# Patient Record
Sex: MALE | Employment: FULL TIME | URBAN - METROPOLITAN AREA
[De-identification: names, ages, dates, MRNs, and addresses within clinical notes are randomized per-mention and may not be internally consistent; named-entity substitution may affect disease eponyms.]

---

## 2019-03-26 ENCOUNTER — TELEPHONE (OUTPATIENT)
Dept: NEUROSURGERY | Facility: CLINIC | Age: 54
End: 2019-03-26

## 2019-03-26 NOTE — TELEPHONE ENCOUNTER
----- Message from Mari Ramos sent at 3/21/2019 11:23 AM CDT -----  Regarding: Neurosurgery Referral/Records  Good Afternoon,    Please see the media tab for the referral and records (recent MRI report included)  from Dr. Simon's office. The patient is being referred for degenerative disc disease. Please let me know if you have any questions .    Thank you!    Mari Ramos   [facility]  [phone]

## 2019-04-01 ENCOUNTER — INITIAL CONSULT (OUTPATIENT)
Dept: NEUROSURGERY | Facility: CLINIC | Age: 54
End: 2019-04-01
Payer: COMMERCIAL

## 2019-04-01 ENCOUNTER — HOSPITAL ENCOUNTER (OUTPATIENT)
Dept: RADIOLOGY | Facility: HOSPITAL | Age: 54
Discharge: HOME | End: 2019-04-01
Attending: NEUROLOGICAL SURGERY
Payer: COMMERCIAL

## 2019-04-01 VITALS
BODY MASS INDEX: 23.65 KG/M2 | HEIGHT: 74 IN | SYSTOLIC BLOOD PRESSURE: 115 MMHG | RESPIRATION RATE: 18 BRPM | DIASTOLIC BLOOD PRESSURE: 79 MMHG | HEART RATE: 85 BPM | WEIGHT: 184.31 LBS

## 2019-04-01 DIAGNOSIS — M54.12 CERVICAL RADICULOPATHY AT C7: ICD-10-CM

## 2019-04-01 DIAGNOSIS — M54.2 NECK PAIN: ICD-10-CM

## 2019-04-01 DIAGNOSIS — M54.2 NECK PAIN: Primary | ICD-10-CM

## 2019-04-01 DIAGNOSIS — M50.20 CERVICAL DISC HERNIATION: ICD-10-CM

## 2019-04-01 PROCEDURE — 72040 XR CERVICAL SPINE FLEXION  AND EXTENSION ONLY: ICD-10-PCS | Mod: 26,,, | Performed by: RADIOLOGY

## 2019-04-01 PROCEDURE — 99999 PR PBB SHADOW E&M-EST. PATIENT-LVL III: CPT | Mod: PBBFAC,,, | Performed by: NEUROLOGICAL SURGERY

## 2019-04-01 PROCEDURE — 3008F BODY MASS INDEX DOCD: CPT | Mod: CPTII,S$GLB,, | Performed by: NEUROLOGICAL SURGERY

## 2019-04-01 PROCEDURE — 72040 X-RAY EXAM NECK SPINE 2-3 VW: CPT | Mod: 26,,, | Performed by: RADIOLOGY

## 2019-04-01 PROCEDURE — 99205 PR OFFICE/OUTPT VISIT, NEW, LEVL V, 60-74 MIN: ICD-10-PCS | Mod: S$GLB,,, | Performed by: NEUROLOGICAL SURGERY

## 2019-04-01 PROCEDURE — 99999 PR PBB SHADOW E&M-EST. PATIENT-LVL III: ICD-10-PCS | Mod: PBBFAC,,, | Performed by: NEUROLOGICAL SURGERY

## 2019-04-01 PROCEDURE — 99205 OFFICE O/P NEW HI 60 MIN: CPT | Mod: S$GLB,,, | Performed by: NEUROLOGICAL SURGERY

## 2019-04-01 PROCEDURE — 72040 X-RAY EXAM NECK SPINE 2-3 VW: CPT | Mod: TC,FY,PO

## 2019-04-01 PROCEDURE — 3008F PR BODY MASS INDEX (BMI) DOCUMENTED: ICD-10-PCS | Mod: CPTII,S$GLB,, | Performed by: NEUROLOGICAL SURGERY

## 2019-04-01 RX ORDER — TRAMADOL HYDROCHLORIDE 50 MG/1
TABLET ORAL
COMMUNITY
Start: 2019-02-25 | End: 2019-04-22

## 2019-04-01 RX ORDER — SUMATRIPTAN SUCCINATE 100 MG/1
100 TABLET ORAL
COMMUNITY
Start: 2019-02-21

## 2019-04-01 RX ORDER — GABAPENTIN 400 MG/1
CAPSULE ORAL
Refills: 1 | COMMUNITY
Start: 2018-12-30 | End: 2019-04-22

## 2019-04-01 RX ORDER — AMITRIPTYLINE HYDROCHLORIDE 25 MG/1
TABLET, FILM COATED ORAL
COMMUNITY
Start: 2019-03-20 | End: 2019-04-22 | Stop reason: SDUPTHER

## 2019-04-01 RX ORDER — TADALAFIL 20 MG/1
TABLET ORAL
COMMUNITY
Start: 2019-02-21 | End: 2019-04-22

## 2019-04-01 RX ORDER — CYANOCOBALAMIN 1000 UG/ML
1000 INJECTION, SOLUTION INTRAMUSCULAR; SUBCUTANEOUS
COMMUNITY
Start: 2019-03-11

## 2019-04-01 RX ORDER — AMITRIPTYLINE HYDROCHLORIDE 10 MG/1
10 TABLET, FILM COATED ORAL NIGHTLY PRN
COMMUNITY
Start: 2019-02-25 | End: 2019-05-21

## 2019-04-01 RX ORDER — CYCLOBENZAPRINE HCL 10 MG
10 TABLET ORAL 3 TIMES DAILY PRN
COMMUNITY
Start: 2019-02-25 | End: 2019-05-21

## 2019-04-01 NOTE — PROGRESS NOTES
Neurosurgery History and Physical    Patient ID: Julian Almanza is a 54 y.o. male.    Chief Complaint   Patient presents with    Neck Pain     Neck pain with increased severity 1 month ago. pain starts min nedk and radiates to rt arm with numbness & tingling to fingertips.  Shooting pain to right tricept. NO pain, numbness or tingling down right arm. 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 Saw Mo Sanabria/Neurology & Dr. Tona Briones in [place], [place] ?Neurosurgery recommends a 2 nd opinion. Oswestry = 82, PHQ = 4         Review of Systems   Constitutional: Positive for activity change.   HENT: Negative.    Eyes: Negative.    Respiratory: Negative.    Cardiovascular: Negative.    Gastrointestinal: Negative.    Endocrine: Negative.    Genitourinary: Negative.    Musculoskeletal: Positive for neck pain.   Skin: Negative.    Allergic/Immunologic: Negative.    Neurological: Positive for weakness, numbness and headaches.   Hematological: Negative.    Psychiatric/Behavioral: Negative.        No past medical history on file.  Social History     Socioeconomic History    Marital status:      Spouse name: Not on file    Number of children: Not on file    Years of education: Not on file    Highest education level: Not on file   Occupational History    Not on file   Social Needs    Financial resource strain: Not on file    Food insecurity:     Worry: Not on file     Inability: Not on file    Transportation needs:     Medical: Not on file     Non-medical: Not on file   Tobacco Use    Smoking status: Not on file   Substance and Sexual Activity    Alcohol use: Not on file    Drug use: Not on file    Sexual activity: Not on file   Lifestyle    Physical activity:     Days per week: Not on file     Minutes per session: Not on file    Stress: Not on file   Relationships    Social connections:     Talks on phone: Not on file     Gets together: Not on file     Attends  Druze service: Not on file     Active member of club or organization: Not on file     Attends meetings of clubs or organizations: Not on file     Relationship status: Not on file    Intimate partner violence:     Fear of current or ex partner: Not on file     Emotionally abused: Not on file     Physically abused: Not on file     Forced sexual activity: Not on file   Other Topics Concern    Not on file   Social History Narrative    Not on file     No family history on file.  Review of patient's allergies indicates:  Allergies not on file  No current outpatient medications on file.  There were no vitals taken for this visit.      Neurologic Exam     Mental Status   Oriented to person, place, and time.   Attention: normal. Concentration: normal.   Speech: speech is normal   Level of consciousness: alert  Knowledge: good.     Cranial Nerves     CN II   Visual acuity: normal    CN III, IV, VI   Pupils are equal, round, and reactive to light.  Extraocular motions are normal.     CN V   Facial sensation intact.     CN VII   Facial expression full, symmetric.     CN VIII   Hearing: intact    CN IX, X   Palate: symmetric    CN XI   CN XI normal.     CN XII   CN XII normal.     Motor Exam   Muscle bulk: normal  Overall muscle tone: normal  Right arm pronator drift: absent  Left arm pronator drift: absent    Strength   Right deltoid: 5/5  Left deltoid: 5/5  Right biceps: 5/5  Left biceps: 5/5  Right triceps: 3/5  Left triceps: 5/5  Right wrist flexion: 4/5  Left wrist flexion: 5/5  Right wrist extension: 5/5  Left wrist extension: 5/5  Right interossei: 5/5  Left interossei: 5/5  Right iliopsoas: 5/5  Left iliopsoas: 5/5  Right quadriceps: 5/5  Left quadriceps: 5/5  Right hamstrin/5  Left hamstrin/5  Right anterior tibial: 5/5  Left anterior tibial: 5/5  Right posterior tibial: 5/5  Left posterior tibial: 5/5  Right peroneal: 5/5  Left peroneal: 5/5  Right gastroc: 5/5  Left gastroc: 5/5    Sensory Exam   Left  arm light touch: normal  Right leg light touch: normal  Left leg light touch: normal  Sensory deficit distribution on right: C7    Gait, Coordination, and Reflexes     Gait  Gait: normal    Coordination   Romberg: negative  Finger to nose coordination: normal    Tremor   Resting tremor: absent    Reflexes   Right brachioradialis: 1+  Left brachioradialis: 1+  Right biceps: 1+  Left biceps: 1+  Right triceps: 0  Left triceps: 0  Right patellar: 0  Left patellar: 0  Right achilles: 1+  Left achilles: 1+  Right plantar: normal  Left plantar: normal  Right Dykes: absent  Left Dykes: absent  Right ankle clonus: absent  Left ankle clonus: absent      Physical Exam   Constitutional: He is oriented to person, place, and time. He appears well-developed and well-nourished.   HENT:   Head: Normocephalic and atraumatic.   Eyes: Pupils are equal, round, and reactive to light. EOM are normal.   Neck: Normal range of motion. Neck supple.   Cardiovascular: Normal rate and regular rhythm.   Pulmonary/Chest: Effort normal.   Abdominal: Soft.   Musculoskeletal: Normal range of motion.   Neurological: He is alert and oriented to person, place, and time. He has a normal Finger-Nose-Finger Test and a normal Romberg Test. Gait normal.   Reflex Scores:       Tricep reflexes are 0 on the right side and 0 on the left side.       Bicep reflexes are 1+ on the right side and 1+ on the left side.       Brachioradialis reflexes are 1+ on the right side and 1+ on the left side.       Patellar reflexes are 0 on the right side and 0 on the left side.       Achilles reflexes are 1+ on the right side and 1+ on the left side.  Skin: Skin is warm and dry.   Psychiatric: He has a normal mood and affect. His speech is normal and behavior is normal. Judgment and thought content normal.   Nursing note and vitals reviewed.       ]    Provider dictation:  I reviewed the imaging. There is a broad-based C6-C7 disc herniation, excentric to the right with  severe right foraminal stenosis. There are disc bulges, more prominent on the left, at other levels without significant stenosis. No spinal cord compression.     Several years of intermittent neck pain treated with NSAIDs. For over one month, he has noted severe neck pain radiating to the right upper extremity, especially in the triceps area, and paresthesias in all fingers. He tried oral steroids, gabapentin, bracing, PT (Pt is a physical therapist himself) and despite these interventions, his pain has worsened. He can no sleep more than 30 minutes. He has not been able to work. He has noted progressive weakness in his right triceps.     On exam, he has severe weakness in his right triceps and mild weakness in his right wrist flexion and hand . No myelopathy.    In light of his severe, progressive neurological deficits, he is a surgical candidate. He has already seen a neurosurgeon in [place] who offered him a C6-C7 ACDF but he is interested in artificial disc replacement and that surgeon did not perform them. I believe he is a good candidate for disc replacement due to his preserved disc height and absence of significant facet arthropathy. We will obtain dynamic XR to rule out instability but provided these are unremarkable, I have offered him a C6-C7 disc arthroplasty to decompress his nerve roots.    I have discussed the risks, benefits and alternatives to the proposed operation in detail. All questions were answered. Risks discussed include but are not limited to: bleeding, infection, pain, scarring, spinal fluid leak, injury to the spinal cord or nerves, injury to the blood vessels, stroke, heart attack, blood clots, malpositioning or failure of hardware, unintentional fusion, heterotopic bone formation, pseudoarthrosis, adjacent level disease, no improvement or worsening of symptoms, need for more surgery, death. He wishes to proceed. He will need PCP clearance.       Visit Diagnosis:  Neck pain  -     X-Ray  Cervical Spine Flexion And Extension Only; Future; Expected date: 04/01/2019    Cervical radiculopathy at C7    Cervical disc herniation

## 2019-04-01 NOTE — LETTER
April 1, 2019      Last Simon MD  [address] [facility] [place] [address]           [facility]  [address] [facility] [address] [place] [address]  Phone: [phone]  Fax: [phone]          Patient: Julian Almanza   MR Number: [id number]   YOB: 1965   Date of Visit: 4/1/2019       Dear Dr. Last Simon:    Thank you for referring Julian Almanza to me for evaluation. Attached you will find relevant portions of my assessment and plan of care.    If you have questions, please do not hesitate to call me. I look forward to following Julian Almanza along with you.    Sincerely,    Ras Gonsalves MD    Enclosure  CC:  No Recipients    If you would like to receive this communication electronically, please contact [email or web address] or [phone] to request more information on [email or web address] Link access.    For providers and/or their staff who would like to refer a patient to [facility], please contact us through our one-stop-shop provider referral line, [facility], at [phone].    If you feel you have received this communication in error or would no longer like to receive these types of communications, please e-mail [email or web address]

## 2019-04-11 ENCOUNTER — TELEPHONE (OUTPATIENT)
Dept: NEUROSURGERY | Facility: CLINIC | Age: 54
End: 2019-04-11

## 2019-04-11 NOTE — TELEPHONE ENCOUNTER
OK. Please message Dr. Johnson's office and see if they can have him set up early next week for a right C6-7 transforaminal DEIDRE.

## 2019-04-11 NOTE — TELEPHONE ENCOUNTER
I am unable to schedule the patient with Dr. Johnson next week. Is this something that we can schedule with you?

## 2019-04-11 NOTE — TELEPHONE ENCOUNTER
[place],  When contacting the patient to schedule the cervical DEIDRE, he stated that he needed it done next week because he was planning on having the cervical surgery the following week. Is he mistaken? I wouldn't think you'd want him getting an injection just prior to a surgery.

## 2019-04-11 NOTE — TELEPHONE ENCOUNTER
Patient reports that he would like DEIDRE before the surgery. Would like to know if he should proceed or not with injection?

## 2019-04-11 NOTE — TELEPHONE ENCOUNTER
Spoke with the patient and he stated that he is scheduled for surgery on 4/25 and the injection needed to be done next week. Dr. Dorsey is not able to do the same injection and will need to send back to Dr. Johnson. Please advise if we need to override next week.

## 2019-04-12 NOTE — TELEPHONE ENCOUNTER
No DEIDRE if his surgery is scheduled that soon. I will contact patient to let him know. When I sent this message prior there was no date scheduled yet.

## 2019-04-15 DIAGNOSIS — M54.12 CERVICAL RADICULOPATHY: Primary | ICD-10-CM

## 2019-04-15 RX ORDER — SODIUM CHLORIDE, SODIUM LACTATE, POTASSIUM CHLORIDE, CALCIUM CHLORIDE 600; 310; 30; 20 MG/100ML; MG/100ML; MG/100ML; MG/100ML
INJECTION, SOLUTION INTRAVENOUS CONTINUOUS
Status: CANCELLED | OUTPATIENT
Start: 2019-04-15

## 2019-04-15 RX ORDER — LIDOCAINE HYDROCHLORIDE 10 MG/ML
1 INJECTION, SOLUTION EPIDURAL; INFILTRATION; INTRACAUDAL; PERINEURAL ONCE
Status: CANCELLED | OUTPATIENT
Start: 2019-04-15 | End: 2019-04-15

## 2019-04-25 PROBLEM — M54.12 CERVICAL RADICULOPATHY: Status: ACTIVE | Noted: 2019-04-25

## 2019-04-25 PROBLEM — R11.2 N&V (NAUSEA AND VOMITING): Status: ACTIVE | Noted: 2019-04-25

## 2019-04-25 PROBLEM — G43.009 NONINTRACTABLE MIGRAINE: Status: ACTIVE | Noted: 2019-04-25

## 2019-04-26 ENCOUNTER — TELEPHONE (OUTPATIENT)
Dept: NEUROSURGERY | Facility: CLINIC | Age: 54
End: 2019-04-26

## 2019-04-26 DIAGNOSIS — G43.909 MIGRAINE WITHOUT STATUS MIGRAINOSUS, NOT INTRACTABLE, UNSPECIFIED MIGRAINE TYPE: Primary | ICD-10-CM

## 2019-04-26 NOTE — TELEPHONE ENCOUNTER
----- Message from Aleksandra Lowe PA-C sent at 4/26/2019 10:48 AM CDT -----  Please send referral to Dr. Bowen for evaluation and management of migraines.     Thanks,    Aleksandra

## 2019-04-26 NOTE — TELEPHONE ENCOUNTER
Hi can you please schedule pt an apt with Dr. Bowen for migraines? Pt is inpatient and will be notified of apt upon discharge. Thank you!

## 2019-05-09 ENCOUNTER — CLINICAL SUPPORT (OUTPATIENT)
Dept: NEUROSURGERY | Facility: CLINIC | Age: 54
End: 2019-05-09
Payer: COMMERCIAL

## 2019-05-09 DIAGNOSIS — Z98.1 S/P CERVICAL SPINAL FUSION: Primary | ICD-10-CM

## 2019-05-09 DIAGNOSIS — Z98.890 S/P CERVICAL DISC REPLACEMENT: Primary | ICD-10-CM

## 2019-05-09 PROCEDURE — 99999 PR PBB SHADOW E&M-EST. PATIENT-LVL I: CPT | Mod: PBBFAC,,,

## 2019-05-09 PROCEDURE — 99999 PR PBB SHADOW E&M-EST. PATIENT-LVL I: ICD-10-PCS | Mod: PBBFAC,,,

## 2019-05-09 NOTE — PROGRESS NOTES
Pt is 14 days s/p ADR with Dr. Gonsalves. No s/s of infection. Incision cleaned with chloraprep and steri strips removed with no issue. Incision is warm, dry, intact. Pt reports post-operative pain level < pre-operative state. Pt is not requesting medication refill today. Pt re-educated on narcotics policy. Educated patient on weight lifting status, bending/lifting/twisting, and to call with any changes or questions. Pt aware of imaging and f/u appt with provider. No further questions.

## 2019-05-21 ENCOUNTER — OFFICE VISIT (OUTPATIENT)
Dept: NEUROSURGERY | Facility: CLINIC | Age: 54
End: 2019-05-21
Payer: COMMERCIAL

## 2019-05-21 ENCOUNTER — HOSPITAL ENCOUNTER (OUTPATIENT)
Dept: RADIOLOGY | Facility: HOSPITAL | Age: 54
Discharge: HOME | End: 2019-05-21
Attending: PHYSICIAN ASSISTANT
Payer: COMMERCIAL

## 2019-05-21 VITALS
WEIGHT: 184.31 LBS | HEIGHT: 74 IN | BODY MASS INDEX: 23.65 KG/M2 | DIASTOLIC BLOOD PRESSURE: 78 MMHG | HEART RATE: 74 BPM | SYSTOLIC BLOOD PRESSURE: 119 MMHG

## 2019-05-21 DIAGNOSIS — M54.6 CHRONIC MIDLINE THORACIC BACK PAIN: ICD-10-CM

## 2019-05-21 DIAGNOSIS — Z98.890 S/P CERVICAL DISC REPLACEMENT: Primary | ICD-10-CM

## 2019-05-21 DIAGNOSIS — G89.29 CHRONIC MIDLINE THORACIC BACK PAIN: ICD-10-CM

## 2019-05-21 DIAGNOSIS — Z98.890 S/P CERVICAL DISC REPLACEMENT: ICD-10-CM

## 2019-05-21 PROCEDURE — 99999 PR PBB SHADOW E&M-EST. PATIENT-LVL III: CPT | Mod: PBBFAC,,, | Performed by: PHYSICIAN ASSISTANT

## 2019-05-21 PROCEDURE — 99024 PR POST-OP FOLLOW-UP VISIT: ICD-10-PCS | Mod: S$GLB,,, | Performed by: PHYSICIAN ASSISTANT

## 2019-05-21 PROCEDURE — 72052 X-RAY EXAM NECK SPINE 6/>VWS: CPT | Mod: TC,FY,PO

## 2019-05-21 PROCEDURE — 99024 POSTOP FOLLOW-UP VISIT: CPT | Mod: S$GLB,,, | Performed by: PHYSICIAN ASSISTANT

## 2019-05-21 PROCEDURE — 72052 XR CERVICAL SPINE 5 VIEW WITH FLEX AND EXT: ICD-10-PCS | Mod: 26,,, | Performed by: RADIOLOGY

## 2019-05-21 PROCEDURE — 99999 PR PBB SHADOW E&M-EST. PATIENT-LVL III: ICD-10-PCS | Mod: PBBFAC,,, | Performed by: PHYSICIAN ASSISTANT

## 2019-05-21 PROCEDURE — 72052 X-RAY EXAM NECK SPINE 6/>VWS: CPT | Mod: 26,,, | Performed by: RADIOLOGY

## 2019-05-21 RX ORDER — NORTRIPTYLINE HYDROCHLORIDE 10 MG/1
10 CAPSULE ORAL NIGHTLY
COMMUNITY

## 2019-05-21 NOTE — PROGRESS NOTES
Neurosurgery History & Physical    Patient ID: Julian Almanza is a 54 y.o. male.    Chief Complaint   Patient presents with    Post-op Evaluation     ACDF, doing well no problems since surgery, symptoms resolved.  Has areas of Thoracic Spine hurting, pain 2/10       Review of Systems   Constitutional: Negative for chills, diaphoresis, fatigue and fever.   HENT: Negative for congestion, hearing loss, rhinorrhea and sore throat.    Eyes: Negative for photophobia, pain, redness and visual disturbance.   Respiratory: Negative for cough, chest tightness, shortness of breath and wheezing.    Cardiovascular: Negative for chest pain, palpitations and leg swelling.   Gastrointestinal: Negative for abdominal distention, abdominal pain, constipation, diarrhea, nausea and vomiting.   Genitourinary: Negative for difficulty urinating, dysuria, frequency, hematuria and urgency.   Musculoskeletal: Positive for back pain. Negative for gait problem, myalgias, neck pain and neck stiffness.   Skin: Negative for pallor and rash.   Neurological: Positive for weakness, numbness and headaches. Negative for dizziness, seizures, speech difficulty and light-headedness.   Psychiatric/Behavioral: Negative for confusion, hallucinations and sleep disturbance.       No past medical history on file.  Social History     Socioeconomic History    Marital status:      Spouse name: Not on file    Number of children: Not on file    Years of education: Not on file    Highest education level: Not on file   Occupational History    Not on file   Social Needs    Financial resource strain: Not on file    Food insecurity:     Worry: Not on file     Inability: Not on file    Transportation needs:     Medical: Not on file     Non-medical: Not on file   Tobacco Use    Smoking status: Never Smoker    Smokeless tobacco: Never Used   Substance and Sexual Activity    Alcohol use: Never     Frequency: Never    Drug use: Never    Sexual activity: Not  "on file   Lifestyle    Physical activity:     Days per week: Not on file     Minutes per session: Not on file    Stress: Not on file   Relationships    Social connections:     Talks on phone: Not on file     Gets together: Not on file     Attends Yarsani service: Not on file     Active member of club or organization: Not on file     Attends meetings of clubs or organizations: Not on file     Relationship status: Not on file   Other Topics Concern    Not on file   Social History Narrative    Not on file     Family History   Problem Relation Age of Onset    No Known Problems Mother     Diabetes Father      Review of patient's allergies indicates:   Allergen Reactions    Hydrocodone Nausea And Vomiting    Tramadol Nausea And Vomiting    Ibuprofen Other (See Comments)     Decreased urination       Current Outpatient Medications:     cyanocobalamin 1,000 mcg/mL injection, Inject 1,000 mcg into the muscle every 30 days. Hold, Disp: , Rfl:     eszopiclone (LUNESTA) 2 MG Tab, Take 2 mg by mouth nightly as needed. Take if needed, Disp: , Rfl:     nortriptyline (PAMELOR) 10 MG capsule, Take 10 mg by mouth every evening., Disp: , Rfl:     sumatriptan (IMITREX) 100 MG tablet, Take 100 mg by mouth every 2 (two) hours as needed. Take if needed, Disp: , Rfl:   Blood pressure 119/78, pulse 74, height 6' 2" (1.88 m), weight 83.6 kg (184 lb 4.9 oz).      Neurologic Exam  Mental Status   Oriented to person, place, and time.   Attention: normal. Concentration: normal.   Speech: speech is normal   Level of consciousness: alert  Knowledge: good.      Cranial Nerves      CN II   Visual acuity: normal     CN III, IV, VI   Pupils are equal, round, and reactive to light.  Extraocular motions are normal.      CN V   Facial sensation intact.      CN VII   Facial expression full, symmetric.      CN VIII   Hearing: intact     CN IX, X   Palate: symmetric     CN XI   CN XI normal.      CN XII   CN XII normal.      Motor Exam "   Muscle bulk: normal  Overall muscle tone: normal  Right arm pronator drift: absent  Left arm pronator drift: absent     Strength   Right deltoid: 5/5  Left deltoid: 5/5  Right biceps: 5/5  Left biceps: 5/5  Right triceps: 4+/5  Left triceps: 5/5  Right wrist flexion: 5/5  Left wrist flexion: 5/5  Right wrist extension: 5/5  Left wrist extension: 5/5  Right interossei: 5/5  Left interossei: 5/5  Right iliopsoas: 5/5  Left iliopsoas: 5/5  Right quadriceps: 5/5  Left quadriceps: 5/5  Right hamstrin/5  Left hamstrin/5  Right anterior tibial: 5/5  Left anterior tibial: 5/5  Right posterior tibial: 5/5  Left posterior tibial: 5/5  Right peroneal: 5/5  Left peroneal: 5/5  Right gastroc: 5/5  Left gastroc: 5/5     Sensory Exam   Right arm light touch: decreased in right index finger  Left arm light touch: normal  Right leg light touch: normal  Left leg light touch: normal     Gait, Coordination, and Reflexes      Gait  Gait: normal     Coordination   Romberg: negative  Finger to nose coordination: normal     Tremor   Resting tremor: absent     Reflexes   Right brachioradialis: 1+  Left brachioradialis: 1+  Right biceps: 1+  Left biceps: 1+  Right triceps: 0  Left triceps: 0  Right patellar: 0  Left patellar: 0  Right achilles: 1+  Left achilles: 1+  Right plantar: normal  Left plantar: normal  Right Dykes: absent  Left Dykes: absent  Right ankle clonus: absent  Left ankle clonus: absent    Physical Exam  Constitutional: He is oriented to person, place, and time. He appears well-developed and well-nourished.   HENT:   Head: Normocephalic and atraumatic.   Eyes: Pupils are equal, round, and reactive to light. EOM are normal.   Neck: Normal range of motion. Neck supple.   Cardiovascular: Normal rate and regular rhythm.   Pulmonary/Chest: Effort normal.   Abdominal: Soft.   Musculoskeletal: Normal range of motion.   Neurological: He is alert and oriented to person, place, and time. He has a normal  Finger-Nose-Finger Test and a normal Romberg Test. Gait normal.   Reflex Scores:       Tricep reflexes are 0 on the right side and 0 on the left side.       Bicep reflexes are 1+ on the right side and 1+ on the left side.       Brachioradialis reflexes are 1+ on the right side and 1+ on the left side.       Patellar reflexes are 0 on the right side and 0 on the left side.       Achilles reflexes are 1+ on the right side and 1+ on the left side.  Skin: Skin is warm and dry.   Psychiatric: He has a normal mood and affect. His speech is normal and behavior is normal. Judgment and thought content normal.   Nursing note and vitals reviewed.    Provider dictation:    Mr. Julian Almanza is a 54 year old male who presents for 4 week post-op appointment s/p C6-7 artificial disc replacement with Dr. Gonsalves. Patient reports resolution of right arm pain and improvement in right arm numbness. He has mild residual numbness in the right index finger. He has some mild residual right arm weakness, but improved. He reports continued upper thoracic back pain that was present prior to surgery, but becoming worse and more frequent. Overall, he is very happy with his outcome following surgery.     On exam patient has 4+/5 right tricep weakness, otherwise full strength. Incision is well healed.     X-ray cervical spine with flex/ext personally reviewed and shows appropriate stable placement of artificial disc with no complication.     Mr. Almanza is doing well with significant improvement in right arm pain and weakness. He will follow-up as scheduled for 3 month post-op appointment with Dr. Gonsalves with repeat cervical x-ray with flex/ext. I will also order a thoracic x-ray to be completed prior to appointment to further evaluate upper thoracic back pain. Patient was informed to contact the clinic with any further questions or concerns.     1. S/P cervical disc replacement  X-Ray Cervical Spine 5 View With Flex And Ext   2. Chronic  midline thoracic back pain  X-Ray Thoracic Spine AP Lateral

## 2019-07-22 ENCOUNTER — HOSPITAL ENCOUNTER (OUTPATIENT)
Dept: RADIOLOGY | Facility: HOSPITAL | Age: 54
Discharge: HOME | End: 2019-07-22
Attending: PHYSICIAN ASSISTANT
Payer: COMMERCIAL

## 2019-07-22 ENCOUNTER — OFFICE VISIT (OUTPATIENT)
Dept: NEUROSURGERY | Facility: CLINIC | Age: 54
End: 2019-07-22
Payer: COMMERCIAL

## 2019-07-22 VITALS
BODY MASS INDEX: 24.05 KG/M2 | SYSTOLIC BLOOD PRESSURE: 124 MMHG | HEIGHT: 74 IN | TEMPERATURE: 99 F | DIASTOLIC BLOOD PRESSURE: 69 MMHG | WEIGHT: 187.38 LBS | HEART RATE: 77 BPM

## 2019-07-22 DIAGNOSIS — Z98.1 S/P CERVICAL SPINAL FUSION: ICD-10-CM

## 2019-07-22 DIAGNOSIS — M54.12 CERVICAL RADICULOPATHY AT C7: Primary | ICD-10-CM

## 2019-07-22 DIAGNOSIS — Z98.890 S/P CERVICAL DISC REPLACEMENT: ICD-10-CM

## 2019-07-22 PROCEDURE — 72040 X-RAY EXAM NECK SPINE 2-3 VW: CPT | Mod: TC,FY,PO

## 2019-07-22 PROCEDURE — 99024 POSTOP FOLLOW-UP VISIT: CPT | Mod: S$GLB,,, | Performed by: NEUROLOGICAL SURGERY

## 2019-07-22 PROCEDURE — 72040 X-RAY EXAM NECK SPINE 2-3 VW: CPT | Mod: 26,,, | Performed by: RADIOLOGY

## 2019-07-22 PROCEDURE — 99999 PR PBB SHADOW E&M-EST. PATIENT-LVL III: CPT | Mod: PBBFAC,,, | Performed by: NEUROLOGICAL SURGERY

## 2019-07-22 PROCEDURE — 99024 PR POST-OP FOLLOW-UP VISIT: ICD-10-PCS | Mod: S$GLB,,, | Performed by: NEUROLOGICAL SURGERY

## 2019-07-22 PROCEDURE — 72040 XR CERVICAL SPINE AP LATERAL: ICD-10-PCS | Mod: 26,,, | Performed by: RADIOLOGY

## 2019-07-22 PROCEDURE — 72040 XR CERVICAL SPINE FLEXION  AND EXTENSION ONLY: ICD-10-PCS | Mod: 26,,, | Performed by: RADIOLOGY

## 2019-07-22 PROCEDURE — 99999 PR PBB SHADOW E&M-EST. PATIENT-LVL III: ICD-10-PCS | Mod: PBBFAC,,, | Performed by: NEUROLOGICAL SURGERY

## 2019-07-22 NOTE — PROGRESS NOTES
Neurosurgery History and Physical    Patient ID: Julian Almanza is a 54 y.o. male.    Chief Complaint   Patient presents with    Post-op Evaluation     3 months s/p C6-7 ADR. Continues to report improvement in both neck and upper extremity pain post-operatively.          Review of Systems   Constitutional: Negative.    HENT: Negative.    Eyes: Negative.    Respiratory: Negative.    Cardiovascular: Negative.    Gastrointestinal: Negative.    Endocrine: Negative.    Genitourinary: Negative.    Musculoskeletal: Negative for neck pain.   Skin: Negative.    Allergic/Immunologic: Negative.    Neurological: Positive for weakness. Negative for numbness.   Hematological: Negative.    Psychiatric/Behavioral: Negative.        No past medical history on file.  Social History     Socioeconomic History    Marital status:      Spouse name: Not on file    Number of children: Not on file    Years of education: Not on file    Highest education level: Not on file   Occupational History    Not on file   Social Needs    Financial resource strain: Not on file    Food insecurity:     Worry: Not on file     Inability: Not on file    Transportation needs:     Medical: Not on file     Non-medical: Not on file   Tobacco Use    Smoking status: Never Smoker    Smokeless tobacco: Never Used   Substance and Sexual Activity    Alcohol use: Never     Frequency: Never    Drug use: Never    Sexual activity: Not on file   Lifestyle    Physical activity:     Days per week: Not on file     Minutes per session: Not on file    Stress: Not on file   Relationships    Social connections:     Talks on phone: Not on file     Gets together: Not on file     Attends Zoroastrianism service: Not on file     Active member of club or organization: Not on file     Attends meetings of clubs or organizations: Not on file     Relationship status: Not on file   Other Topics Concern    Not on file   Social History Narrative    Not on file     Family  "History   Problem Relation Age of Onset    No Known Problems Mother     Diabetes Father      Review of patient's allergies indicates:   Allergen Reactions    Hydrocodone Nausea And Vomiting    Tramadol Nausea And Vomiting    Ibuprofen Other (See Comments)     Decreased urination       Current Outpatient Medications:     cyanocobalamin 1,000 mcg/mL injection, Inject 1,000 mcg into the muscle every 30 days. Hold, Disp: , Rfl:     eszopiclone (LUNESTA) 2 MG Tab, Take 2 mg by mouth nightly as needed. Take if needed, Disp: , Rfl:     nortriptyline (PAMELOR) 10 MG capsule, Take 10 mg by mouth every evening., Disp: , Rfl:     sumatriptan (IMITREX) 100 MG tablet, Take 100 mg by mouth every 2 (two) hours as needed. Take if needed, Disp: , Rfl:   Blood pressure 124/69, pulse 77, temperature 98.5 °F (36.9 °C), temperature source Oral, height 6' 2" (1.88 m), weight 85 kg (187 lb 6.3 oz).      Neurologic Exam     Mental Status   Oriented to person, place, and time.   Attention: normal. Concentration: normal.   Speech: speech is normal   Level of consciousness: alert  Knowledge: good.     Cranial Nerves     CN II   Visual acuity: normal    CN III, IV, VI   Pupils are equal, round, and reactive to light.  Extraocular motions are normal.     CN V   Facial sensation intact.     CN VII   Facial expression full, symmetric.     CN VIII   Hearing: intact    CN IX, X   Palate: symmetric    CN XI   CN XI normal.     CN XII   CN XII normal.     Motor Exam   Muscle bulk: normal  Overall muscle tone: normal  Right arm pronator drift: absent  Left arm pronator drift: absent    Strength   Right deltoid: 5/5  Left deltoid: 5/5  Right biceps: 5/5  Left biceps: 5/5  Right triceps: 4/5  Left triceps: 5/5  Right wrist flexion: 5/5  Left wrist flexion: 5/5  Right wrist extension: 5/5  Left wrist extension: 5/5  Right interossei: 5/5  Left interossei: 5/5  Right iliopsoas: 5/5  Left iliopsoas: 5/5  Right quadriceps: 5/5  Left quadriceps: " 5/5  Right hamstrin/5  Left hamstrin/5  Right anterior tibial: 5/5  Left anterior tibial: 5/5  Right posterior tibial: 5/5  Left posterior tibial: 5/5  Right peroneal: 5/5  Left peroneal: 5/5  Right gastroc: 5/5  Left gastroc: 5/5    Sensory Exam   Right arm light touch: normal  Left arm light touch: normal  Right leg light touch: normal  Left leg light touch: normal    Gait, Coordination, and Reflexes     Gait  Gait: normal    Coordination   Romberg: negative  Finger to nose coordination: normal    Tremor   Resting tremor: absent    Reflexes   Right brachioradialis: 1+  Left brachioradialis: 1+  Right biceps: 1+  Left biceps: 1+  Right triceps: 0  Left triceps: 0  Right patellar: 0  Left patellar: 0  Right achilles: 1+  Left achilles: 1+  Right plantar: normal  Left plantar: normal  Right Dykes: absent  Left Dykes: absent  Right ankle clonus: absent  Left ankle clonus: absent      Physical Exam   Constitutional: He is oriented to person, place, and time. He appears well-developed and well-nourished.   HENT:   Head: Normocephalic and atraumatic.   Eyes: Pupils are equal, round, and reactive to light. EOM are normal.   Neck: Normal range of motion. Neck supple.   Cardiovascular: Normal rate and regular rhythm.   Pulmonary/Chest: Effort normal.   Abdominal: Soft.   Musculoskeletal: Normal range of motion.   Neurological: He is alert and oriented to person, place, and time. He has a normal Finger-Nose-Finger Test and a normal Romberg Test. Gait normal.   Reflex Scores:       Tricep reflexes are 0 on the right side and 0 on the left side.       Bicep reflexes are 1+ on the right side and 1+ on the left side.       Brachioradialis reflexes are 1+ on the right side and 1+ on the left side.       Patellar reflexes are 0 on the right side and 0 on the left side.       Achilles reflexes are 1+ on the right side and 1+ on the left side.  Skin: Skin is warm and dry.   Psychiatric: He has a normal mood and affect.  "His speech is normal and behavior is normal. Judgment and thought content normal.   Nursing note and vitals reviewed.      Vital Signs  Temp: 98.5 °F (36.9 °C)  Temp src: Oral  Pulse: 77  BP: 124/69  Pain Score: 0-No pain  Height and Weight  Height: 6' 2" (188 cm)  Weight: 85 kg (187 lb 6.3 oz)  BSA (Calculated - sq m): 2.11 sq meters  BMI (Calculated): 24.1  Weight in (lb) to have BMI = 25: 194.3]    Provider dictation:  I reviewed the imaging. The hardware and spinal alignment are stable. No flexion-extension views were done.    Dong very well with complete resolution of his right arm numbness and significant improvement in his strength. His per-op neck pain is resolved but he still has a little soreness if he over exerts himself. He also still complains of intermittent interscapular pain.     On exam, his right wrist flexion weakness is resolved and his triceps is almost full strength. His incision is well healed.    Doing very well and very pleased with the outcome of surgery. We will order dynamic X rays today to assess the device mobility and repeat in 3 months with follow up.      Visit Diagnosis:  Cervical radiculopathy at C7  -     X-Ray Cervical Spine Flexion And Extension Only; Future; Expected date: 07/22/2019      "

## 2019-07-25 DIAGNOSIS — M54.12 CERVICAL RADICULOPATHY AT C7: Primary | ICD-10-CM

## 2019-07-25 DIAGNOSIS — Z98.890 S/P CERVICAL DISC REPLACEMENT: ICD-10-CM

## 2019-10-21 ENCOUNTER — TELEPHONE (OUTPATIENT)
Dept: NEUROSURGERY | Facility: CLINIC | Age: 54
End: 2019-10-21

## 2020-10-07 ENCOUNTER — HISTORICAL (OUTPATIENT)
Dept: ADMINISTRATIVE | Facility: HOSPITAL | Age: 55
End: 2020-10-07

## 2020-10-08 LAB — SARS-COV+SARS-COV-2 AG RESP QL IA.RAPID: NEGATIVE

## 2020-11-18 ENCOUNTER — HISTORICAL (OUTPATIENT)
Dept: ADMINISTRATIVE | Facility: HOSPITAL | Age: 55
End: 2020-11-18

## 2020-11-19 LAB — SARS-COV+SARS-COV-2 AG RESP QL IA.RAPID: NEGATIVE

## 2021-09-21 ENCOUNTER — OFFICE VISIT (OUTPATIENT)
Dept: OTOLARYNGOLOGY | Facility: CLINIC | Age: 56
End: 2021-09-21
Payer: COMMERCIAL

## 2021-09-21 VITALS — WEIGHT: 180 LBS | HEIGHT: 74 IN | BODY MASS INDEX: 23.1 KG/M2

## 2021-09-21 DIAGNOSIS — J02.9 SORE THROAT: ICD-10-CM

## 2021-09-21 DIAGNOSIS — J02.9 PHARYNGITIS, UNSPECIFIED ETIOLOGY: Primary | ICD-10-CM

## 2021-09-21 PROCEDURE — 31575 DIAGNOSTIC LARYNGOSCOPY: CPT | Mod: S$PBB,,, | Performed by: OTOLARYNGOLOGY

## 2021-09-21 PROCEDURE — 31575 PR LARYNGOSCOPY, FLEXIBLE; DIAGNOSTIC: ICD-10-PCS | Mod: S$PBB,,, | Performed by: OTOLARYNGOLOGY

## 2021-09-21 PROCEDURE — 31575 DIAGNOSTIC LARYNGOSCOPY: CPT | Mod: PBBFAC | Performed by: OTOLARYNGOLOGY

## 2021-09-21 PROCEDURE — 1159F PR MEDICATION LIST DOCUMENTED IN MEDICAL RECORD: ICD-10-PCS | Mod: ,,, | Performed by: OTOLARYNGOLOGY

## 2021-09-21 PROCEDURE — 3008F PR BODY MASS INDEX (BMI) DOCUMENTED: ICD-10-PCS | Mod: ,,, | Performed by: OTOLARYNGOLOGY

## 2021-09-21 PROCEDURE — 1159F MED LIST DOCD IN RCRD: CPT | Mod: ,,, | Performed by: OTOLARYNGOLOGY

## 2021-09-21 PROCEDURE — 99213 OFFICE O/P EST LOW 20 MIN: CPT | Mod: PBBFAC,25 | Performed by: OTOLARYNGOLOGY

## 2021-09-21 PROCEDURE — 99204 OFFICE O/P NEW MOD 45 MIN: CPT | Mod: S$PBB,25,, | Performed by: OTOLARYNGOLOGY

## 2021-09-21 PROCEDURE — 3008F BODY MASS INDEX DOCD: CPT | Mod: ,,, | Performed by: OTOLARYNGOLOGY

## 2021-09-21 PROCEDURE — 1160F PR REVIEW ALL MEDS BY PRESCRIBER/CLIN PHARMACIST DOCUMENTED: ICD-10-PCS | Mod: ,,, | Performed by: OTOLARYNGOLOGY

## 2021-09-21 PROCEDURE — 1160F RVW MEDS BY RX/DR IN RCRD: CPT | Mod: ,,, | Performed by: OTOLARYNGOLOGY

## 2021-09-21 PROCEDURE — 99204 PR OFFICE/OUTPT VISIT, NEW, LEVL IV, 45-59 MIN: ICD-10-PCS | Mod: S$PBB,25,, | Performed by: OTOLARYNGOLOGY

## 2021-09-21 RX ORDER — AMOXICILLIN 500 MG/1
500 CAPSULE ORAL EVERY 12 HOURS
Qty: 28 CAPSULE | Refills: 0 | Status: SHIPPED | OUTPATIENT
Start: 2021-09-21 | End: 2021-10-05